# Patient Record
Sex: FEMALE | Race: WHITE | Employment: FULL TIME | URBAN - NONMETROPOLITAN AREA
[De-identification: names, ages, dates, MRNs, and addresses within clinical notes are randomized per-mention and may not be internally consistent; named-entity substitution may affect disease eponyms.]

---

## 2017-01-03 ENCOUNTER — OFFICE VISIT (OUTPATIENT)
Dept: ONCOLOGY | Age: 47
End: 2017-01-03

## 2017-01-03 VITALS
HEART RATE: 65 BPM | OXYGEN SATURATION: 99 % | RESPIRATION RATE: 16 BRPM | DIASTOLIC BLOOD PRESSURE: 79 MMHG | SYSTOLIC BLOOD PRESSURE: 121 MMHG | HEIGHT: 64 IN | WEIGHT: 182 LBS | BODY MASS INDEX: 31.07 KG/M2 | TEMPERATURE: 97.4 F

## 2017-01-03 DIAGNOSIS — Z98.890 STATUS POST LEFT BREAST LUMPECTOMY: ICD-10-CM

## 2017-01-03 DIAGNOSIS — Z51.81 ENCOUNTER FOR MONITORING TAMOXIFEN THERAPY: ICD-10-CM

## 2017-01-03 DIAGNOSIS — Z79.810 ENCOUNTER FOR MONITORING TAMOXIFEN THERAPY: ICD-10-CM

## 2017-01-03 DIAGNOSIS — C50.912 MALIGNANT NEOPLASM OF LEFT FEMALE BREAST, UNSPECIFIED SITE OF BREAST: Primary | ICD-10-CM

## 2017-01-03 PROCEDURE — 99214 OFFICE O/P EST MOD 30 MIN: CPT | Performed by: INTERNAL MEDICINE

## 2017-01-03 RX ORDER — LEVETIRACETAM 1000 MG/1
1000 TABLET ORAL
COMMUNITY

## 2017-01-03 RX ORDER — HYDROCODONE BITARTRATE AND ACETAMINOPHEN 5; 325 MG/1; MG/1
TABLET ORAL
COMMUNITY
Start: 2016-12-17 | End: 2017-07-03

## 2017-01-03 RX ORDER — CHOLECALCIFEROL (VITAMIN D3) 25 MCG
1000 CAPSULE ORAL
COMMUNITY

## 2017-01-03 RX ORDER — TAMOXIFEN CITRATE 20 MG/1
20 TABLET ORAL
COMMUNITY
Start: 2016-03-07 | End: 2017-01-03 | Stop reason: SDUPTHER

## 2017-01-03 RX ORDER — TAMOXIFEN CITRATE 20 MG/1
20 TABLET ORAL DAILY
Qty: 90 TABLET | Refills: 3 | Status: SHIPPED | OUTPATIENT
Start: 2017-01-03 | End: 2018-01-03 | Stop reason: SDUPTHER

## 2017-01-04 PROBLEM — Z98.890 STATUS POST LEFT BREAST LUMPECTOMY: Status: ACTIVE | Noted: 2017-01-04

## 2017-01-04 ASSESSMENT — ENCOUNTER SYMPTOMS
RECTAL PAIN: 0
WHEEZING: 0
BLOOD IN STOOL: 0
COLOR CHANGE: 0
DIARRHEA: 0
NAUSEA: 0
CHEST TIGHTNESS: 0
EYE DISCHARGE: 0
BACK PAIN: 0
COUGH: 0
ABDOMINAL PAIN: 0
ABDOMINAL DISTENTION: 0
SHORTNESS OF BREATH: 0
VOMITING: 0
TROUBLE SWALLOWING: 0
CONSTIPATION: 0
FACIAL SWELLING: 0
SORE THROAT: 0

## 2017-07-03 ENCOUNTER — OFFICE VISIT (OUTPATIENT)
Dept: ONCOLOGY | Age: 47
End: 2017-07-03

## 2017-07-03 VITALS
SYSTOLIC BLOOD PRESSURE: 121 MMHG | HEIGHT: 64 IN | HEART RATE: 50 BPM | OXYGEN SATURATION: 98 % | TEMPERATURE: 98 F | WEIGHT: 186.2 LBS | DIASTOLIC BLOOD PRESSURE: 77 MMHG | RESPIRATION RATE: 16 BRPM | BODY MASS INDEX: 31.79 KG/M2

## 2017-07-03 DIAGNOSIS — C50.912 MALIGNANT NEOPLASM OF LEFT FEMALE BREAST, UNSPECIFIED SITE OF BREAST: ICD-10-CM

## 2017-07-03 DIAGNOSIS — Z79.810 USE OF TAMOXIFEN (NOLVADEX): ICD-10-CM

## 2017-07-03 DIAGNOSIS — Z98.890 STATUS POST LEFT BREAST LUMPECTOMY: Primary | ICD-10-CM

## 2017-07-03 PROCEDURE — 99214 OFFICE O/P EST MOD 30 MIN: CPT | Performed by: INTERNAL MEDICINE

## 2017-07-03 ASSESSMENT — ENCOUNTER SYMPTOMS
NAUSEA: 0
BLOOD IN STOOL: 0
COUGH: 0
COLOR CHANGE: 0
BACK PAIN: 0
EYE DISCHARGE: 0
CHEST TIGHTNESS: 0
SORE THROAT: 0
RECTAL PAIN: 0
SHORTNESS OF BREATH: 0
DIARRHEA: 0
FACIAL SWELLING: 0
ABDOMINAL DISTENTION: 0
VOMITING: 0
WHEEZING: 0
ABDOMINAL PAIN: 0
CONSTIPATION: 0
TROUBLE SWALLOWING: 0

## 2018-01-03 ENCOUNTER — HOSPITAL ENCOUNTER (OUTPATIENT)
Dept: INFUSION THERAPY | Age: 48
Discharge: HOME | End: 2018-01-03
Payer: COMMERCIAL

## 2018-01-03 ENCOUNTER — OFFICE VISIT (OUTPATIENT)
Dept: ONCOLOGY | Age: 48
End: 2018-01-03
Payer: COMMERCIAL

## 2018-01-03 VITALS
WEIGHT: 191.2 LBS | SYSTOLIC BLOOD PRESSURE: 114 MMHG | TEMPERATURE: 96.9 F | DIASTOLIC BLOOD PRESSURE: 77 MMHG | OXYGEN SATURATION: 96 % | BODY MASS INDEX: 32.64 KG/M2 | RESPIRATION RATE: 16 BRPM | HEIGHT: 64 IN | HEART RATE: 65 BPM

## 2018-01-03 DIAGNOSIS — Z79.810 USE OF TAMOXIFEN (NOLVADEX): ICD-10-CM

## 2018-01-03 DIAGNOSIS — Z98.890 STATUS POST LEFT BREAST LUMPECTOMY: Primary | ICD-10-CM

## 2018-01-03 DIAGNOSIS — Z17.0 MALIGNANT NEOPLASM OF LEFT BREAST IN FEMALE, ESTROGEN RECEPTOR POSITIVE, UNSPECIFIED SITE OF BREAST (HCC): ICD-10-CM

## 2018-01-03 DIAGNOSIS — C50.912 MALIGNANT NEOPLASM OF LEFT BREAST IN FEMALE, ESTROGEN RECEPTOR POSITIVE, UNSPECIFIED SITE OF BREAST (HCC): ICD-10-CM

## 2018-01-03 PROCEDURE — 99214 OFFICE O/P EST MOD 30 MIN: CPT | Performed by: INTERNAL MEDICINE

## 2018-01-03 PROCEDURE — 99211 OFF/OP EST MAY X REQ PHY/QHP: CPT

## 2018-01-03 RX ORDER — TAMOXIFEN CITRATE 20 MG/1
20 TABLET ORAL DAILY
Qty: 90 TABLET | Refills: 3 | Status: SHIPPED | OUTPATIENT
Start: 2018-01-03 | End: 2019-03-04 | Stop reason: SDUPTHER

## 2018-01-03 ASSESSMENT — ENCOUNTER SYMPTOMS
TROUBLE SWALLOWING: 0
COUGH: 0
EYE DISCHARGE: 0
FACIAL SWELLING: 0
SORE THROAT: 0
RECTAL PAIN: 0
CHEST TIGHTNESS: 0
DIARRHEA: 0
NAUSEA: 0
ABDOMINAL DISTENTION: 0
CONSTIPATION: 0
BACK PAIN: 0
VOMITING: 0
BLOOD IN STOOL: 0
SHORTNESS OF BREATH: 0
COLOR CHANGE: 0
WHEEZING: 0
ABDOMINAL PAIN: 0

## 2018-01-03 NOTE — PROGRESS NOTES
History on 01/03/2018:  Overall the patient tolerates tamoxifen well. Her main complaints are hot flashes and occasional arthralgia. She does not report any new signs or symptoms concerning for evidence of recurrent disease. In October 2017 the patient had her annual mammogram which was benign. She has been not placed on any new medications since her last visit with me, no visits to the emergency room, no hospitalization. HPI   Past Medical History:   Diagnosis Date    Bruises easily     Cancer ([address]     Breast    Glaucoma     Hemorrhoids     Seizure ([address]     Seizures ([address]     Shingles       Past Surgical History:   Procedure Laterality Date    ANKLE SURGERY  12/2016    [facility]    [facility]    COLONOSCOPY      EYE SURGERY      LOBECTOMY      MASTECTOMY, PARTIAL      TUBAL LIGATION        History reviewed. No pertinent family history. Social History   Substance Use Topics    Smoking status: Former Smoker    Smokeless tobacco: Never Used    Alcohol use No      Current Outpatient Prescriptions   Medication Sig Dispense Refill    tamoxifen (NOLVADEX) 20 MG tablet Take 1 tablet by mouth daily 90 tablet 3    levETIRAcetam (KEPPRA) 1000 MG tablet Take 1,000 mg by mouth      Cholecalciferol (VITAMIN D-3) 1000 UNITS CAPS Take 1,000 Units by mouth       No current facility-administered medications for this visit. Allergies   Allergen Reactions    Carboprost Tromethamine Anxiety and Nausea And Vomiting      Health Maintenance   Topic Date Due    HIV screen  09/14/1983    DTaP/Tdap/Td vaccine (1 - Tdap) 09/14/1987    Cervical cancer screen  09/14/1989    Lipid screen  09/14/2008    Diabetes screen  09/14/2008    Breast cancer screen  09/30/2016    Flu vaccine (1) 09/01/2017        Subjective:   Review of Systems   Constitutional: Negative for activity change, appetite change, fatigue and fever.    HENT: Negative for congestion, dental problem, facial swelling, hearing loss, mouth sores, nosebleeds, sore throat, tinnitus and trouble swallowing. Eyes: Negative for discharge and visual disturbance. Respiratory: Negative for cough, chest tightness, shortness of breath and wheezing. Cardiovascular: Negative for chest pain, palpitations and leg swelling. Gastrointestinal: Negative for abdominal distention, abdominal pain, blood in stool, constipation, diarrhea, nausea, rectal pain and vomiting. Endocrine: Positive for heat intolerance. Negative for cold intolerance, polydipsia and polyuria. Genitourinary: Negative for decreased urine volume, difficulty urinating, dysuria, flank pain, hematuria and urgency. Musculoskeletal: Negative for arthralgias, back pain, gait problem, joint swelling, myalgias and neck stiffness. Skin: Negative for color change, rash and wound. Neurological: Negative for dizziness, tremors, seizures, speech difficulty, weakness, light-headedness, numbness and headaches. Hematological: Negative for adenopathy. Does not bruise/bleed easily. Psychiatric/Behavioral: Negative for confusion and sleep disturbance. The patient is not nervous/anxious. Objective:   Physical Exam   Constitutional: She is oriented to person, place, and time. She appears well-developed and well-nourished. No distress. HENT:   Head: Normocephalic. Mouth/Throat: Oropharynx is clear and moist. No oropharyngeal exudate. Eyes: EOM are normal. Pupils are equal, round, and reactive to light. Right eye exhibits no discharge. Left eye exhibits no discharge. No scleral icterus. Neck: Normal range of motion. Neck supple. No JVD present. No tracheal deviation present. No thyromegaly present. Cardiovascular: Normal rate and normal heart sounds. Exam reveals no gallop and no friction rub. No murmur heard. Pulmonary/Chest: Effort normal and breath sounds normal. No stridor. No respiratory distress. She has no wheezes.  She

## 2019-03-04 ENCOUNTER — HOSPITAL ENCOUNTER (OUTPATIENT)
Dept: INFUSION THERAPY | Age: 49
Discharge: HOME | End: 2019-03-04
Payer: COMMERCIAL

## 2019-03-04 ENCOUNTER — OFFICE VISIT (OUTPATIENT)
Dept: ONCOLOGY | Age: 49
End: 2019-03-04
Payer: COMMERCIAL

## 2019-03-04 VITALS
SYSTOLIC BLOOD PRESSURE: 108 MMHG | HEIGHT: 64 IN | HEART RATE: 68 BPM | BODY MASS INDEX: 27.18 KG/M2 | TEMPERATURE: 98.1 F | WEIGHT: 159.2 LBS | DIASTOLIC BLOOD PRESSURE: 61 MMHG | OXYGEN SATURATION: 100 % | RESPIRATION RATE: 16 BRPM

## 2019-03-04 DIAGNOSIS — Z17.0 MALIGNANT NEOPLASM OF LEFT BREAST IN FEMALE, ESTROGEN RECEPTOR POSITIVE, UNSPECIFIED SITE OF BREAST (HCC): ICD-10-CM

## 2019-03-04 DIAGNOSIS — Z79.810 USE OF TAMOXIFEN (NOLVADEX): ICD-10-CM

## 2019-03-04 DIAGNOSIS — Z98.890 STATUS POST LEFT BREAST LUMPECTOMY: Primary | ICD-10-CM

## 2019-03-04 DIAGNOSIS — C50.912 MALIGNANT NEOPLASM OF LEFT BREAST IN FEMALE, ESTROGEN RECEPTOR POSITIVE, UNSPECIFIED SITE OF BREAST (HCC): ICD-10-CM

## 2019-03-04 PROCEDURE — 99214 OFFICE O/P EST MOD 30 MIN: CPT | Performed by: INTERNAL MEDICINE

## 2019-03-04 PROCEDURE — 99201 HC NEW PT, OUTPT VISIT LEVEL 1: CPT

## 2019-03-04 RX ORDER — TAMOXIFEN CITRATE 20 MG/1
20 TABLET ORAL DAILY
Qty: 90 TABLET | Refills: 3 | Status: SHIPPED | OUTPATIENT
Start: 2019-03-04 | End: 2020-02-18 | Stop reason: SDUPTHER

## 2019-03-04 ASSESSMENT — ENCOUNTER SYMPTOMS
NAUSEA: 0
EYE DISCHARGE: 0
ABDOMINAL DISTENTION: 0
SHORTNESS OF BREATH: 0
DIARRHEA: 0
VOMITING: 0
TROUBLE SWALLOWING: 0
COLOR CHANGE: 0
COUGH: 0
SORE THROAT: 0
BACK PAIN: 0
CONSTIPATION: 0
RECTAL PAIN: 0
ABDOMINAL PAIN: 0
BLOOD IN STOOL: 0
WHEEZING: 0
CHEST TIGHTNESS: 0
FACIAL SWELLING: 0

## 2020-02-18 RX ORDER — TAMOXIFEN CITRATE 20 MG/1
20 TABLET ORAL DAILY
Qty: 90 TABLET | Refills: 3 | Status: SHIPPED | OUTPATIENT
Start: 2020-02-18 | End: 2021-02-16 | Stop reason: SDUPTHER

## 2020-03-24 ENCOUNTER — TELEPHONE (OUTPATIENT)
Dept: INFUSION THERAPY | Age: 50
End: 2020-03-24

## 2020-05-04 ENCOUNTER — OFFICE VISIT (OUTPATIENT)
Dept: ONCOLOGY | Age: 50
End: 2020-05-04
Payer: COMMERCIAL

## 2020-05-04 ENCOUNTER — HOSPITAL ENCOUNTER (OUTPATIENT)
Dept: INFUSION THERAPY | Age: 50
Discharge: HOME | End: 2020-05-04
Payer: COMMERCIAL

## 2020-05-04 VITALS
WEIGHT: 168.6 LBS | HEIGHT: 64 IN | BODY MASS INDEX: 28.79 KG/M2 | DIASTOLIC BLOOD PRESSURE: 84 MMHG | HEART RATE: 55 BPM | OXYGEN SATURATION: 99 % | SYSTOLIC BLOOD PRESSURE: 126 MMHG | RESPIRATION RATE: 16 BRPM | TEMPERATURE: 98.4 F

## 2020-05-04 PROCEDURE — 99214 OFFICE O/P EST MOD 30 MIN: CPT | Performed by: INTERNAL MEDICINE

## 2020-05-04 PROCEDURE — 99211 OFF/OP EST MAY X REQ PHY/QHP: CPT

## 2020-05-04 ASSESSMENT — ENCOUNTER SYMPTOMS
ABDOMINAL PAIN: 0
NAUSEA: 0
ABDOMINAL DISTENTION: 0
COLOR CHANGE: 0
RECTAL PAIN: 0
TROUBLE SWALLOWING: 0
FACIAL SWELLING: 0
BLOOD IN STOOL: 0
SHORTNESS OF BREATH: 0
CONSTIPATION: 0
DIARRHEA: 0
EYE DISCHARGE: 0
SORE THROAT: 0
WHEEZING: 0
CHEST TIGHTNESS: 0
VOMITING: 0
COUGH: 0
BACK PAIN: 0

## 2020-05-04 NOTE — PROGRESS NOTES
[facility] PROFESSIONAL SERVS  [facility] OF [facility]  [address] [facility] [address]  [address] [address]  [address] [address]  Dept: [phone]  Dept Fax: [phone]: [phone]     Visit Date: 5/4/2020     Neil Gonzalez is a 44 y.o. female who presents today for:   Chief Complaint   Patient presents with    Follow-up     BREAST CANCER        HPI:     Oncologic History:  · July 2014 She palpated a left breast mass  · 09/25/14 Mammogram and ultrasound  · 10/10/14 Ultrasound-guided biopsy showed invasive ductal carcinoma, ER+, NC+, HER2 negative and an ultrasound guided biopsy of an axillary node was negative  11/20/14 Left lumpectomy and sentinel node biopsy by Dr. Zach Bruno. Final pathology report showed: Invasive ductal carcinoma with neuroendocrine differentiation, [place] grade 2 (tubule formation 3, nuclear pleomorphism 2, mitotic activity 1), 2.6 cm in greatest dimension. Ductal carcinoma in situ, cribriform and solid pattern, intermediate nuclear grade. Surgical margins are negative for carcinoma, invasive carcinoma is 0.2 cm from the closest medial margin and the DCIS is 0.1 cm from the closest medial margin. Immunostains for synaptophysin and chromogranin were performed and the results show tumor cells are positive for synaptophysin and chromogranin (focal, patchy), suggesting neuroendocrine differentiation. Left axillary sentinel lymph nodes 1 and 2, excision: One of two lymph nodes is involved by isolated tumor cells in lymphovascular space by H&E and AE1/3 immunostain. Tumor cells are present in a lymphovascular space within the capsule of one lymph node. AE1/3 immunostain is performed on three blocks (BF1, BF2, and BF3) in the evaluation of these lymph nodes. Additional medial margin, excision: Breast tissue, negative for carcinoma.   · Oncotype Dx assay with RS of 36  · 03/20/15 Completed 4 cycles of adjuvant TC  · June 2015 Completed radiation therapy to left breast  · 05/27/15 Started

## 2020-07-20 PROCEDURE — 99213 OFFICE O/P EST LOW 20 MIN: CPT | Performed by: RADIOLOGY

## 2021-02-16 RX ORDER — TAMOXIFEN CITRATE 20 MG/1
20 TABLET ORAL DAILY
Qty: 90 TABLET | Refills: 3 | Status: SHIPPED | OUTPATIENT
Start: 2021-02-16 | End: 2022-02-16 | Stop reason: SDUPTHER

## 2021-05-04 ENCOUNTER — HOSPITAL ENCOUNTER (OUTPATIENT)
Dept: INFUSION THERAPY | Age: 51
Discharge: HOME | End: 2021-05-04
Payer: COMMERCIAL

## 2021-05-04 ENCOUNTER — OFFICE VISIT (OUTPATIENT)
Dept: ONCOLOGY | Age: 51
End: 2021-05-04
Payer: COMMERCIAL

## 2021-05-04 VITALS
BODY MASS INDEX: 29.98 KG/M2 | SYSTOLIC BLOOD PRESSURE: 120 MMHG | TEMPERATURE: 97.9 F | HEIGHT: 64 IN | DIASTOLIC BLOOD PRESSURE: 74 MMHG | HEART RATE: 57 BPM | WEIGHT: 175.6 LBS | RESPIRATION RATE: 16 BRPM | OXYGEN SATURATION: 98 %

## 2021-05-04 DIAGNOSIS — Z17.0 MALIGNANT NEOPLASM OF LEFT BREAST IN FEMALE, ESTROGEN RECEPTOR POSITIVE, UNSPECIFIED SITE OF BREAST (HCC): Primary | ICD-10-CM

## 2021-05-04 DIAGNOSIS — Z17.0 ER+ (ESTROGEN RECEPTOR POSITIVE STATUS): ICD-10-CM

## 2021-05-04 DIAGNOSIS — Z85.3 ENCOUNTER FOR FOLLOW-UP SURVEILLANCE OF BREAST CANCER: ICD-10-CM

## 2021-05-04 DIAGNOSIS — Z08 ENCOUNTER FOR FOLLOW-UP SURVEILLANCE OF BREAST CANCER: ICD-10-CM

## 2021-05-04 DIAGNOSIS — Z79.810 PROPHYLACTIC USE OF TAMOXIFEN: ICD-10-CM

## 2021-05-04 DIAGNOSIS — Z98.890 S/P BREAST LUMPECTOMY: ICD-10-CM

## 2021-05-04 DIAGNOSIS — C50.912 MALIGNANT NEOPLASM OF LEFT BREAST IN FEMALE, ESTROGEN RECEPTOR POSITIVE, UNSPECIFIED SITE OF BREAST (HCC): Primary | ICD-10-CM

## 2021-05-04 PROCEDURE — 99214 OFFICE O/P EST MOD 30 MIN: CPT | Performed by: INTERNAL MEDICINE

## 2021-05-04 PROCEDURE — 99211 OFF/OP EST MAY X REQ PHY/QHP: CPT

## 2021-05-04 ASSESSMENT — ENCOUNTER SYMPTOMS
COUGH: 0
COLOR CHANGE: 0
BLOOD IN STOOL: 0
SHORTNESS OF BREATH: 0
ABDOMINAL DISTENTION: 0
SORE THROAT: 0
EYE DISCHARGE: 0
DIARRHEA: 0
RECTAL PAIN: 0
WHEEZING: 0
ABDOMINAL PAIN: 0
TROUBLE SWALLOWING: 0
NAUSEA: 0
CHEST TIGHTNESS: 0
BACK PAIN: 0
CONSTIPATION: 0
FACIAL SWELLING: 0
VOMITING: 0

## 2021-05-04 NOTE — PATIENT INSTRUCTIONS
1.  She was instructed to wean herself off  Zoloft. She will call with update on her mood. 2.  The patient was instructed to see a gynecologist to while staying on tamoxifen  3.   RTC to see me in 1 year

## 2021-05-18 RX ORDER — TAMOXIFEN CITRATE 20 MG/1
20 TABLET ORAL DAILY
Qty: 20 TABLET | Refills: 1 | Status: SHIPPED | OUTPATIENT
Start: 2021-05-18 | End: 2022-05-06

## 2022-02-16 RX ORDER — TAMOXIFEN CITRATE 20 MG/1
20 TABLET ORAL DAILY
Qty: 90 TABLET | Refills: 3 | Status: SHIPPED | OUTPATIENT
Start: 2022-02-16

## 2022-05-05 ASSESSMENT — ENCOUNTER SYMPTOMS
NAUSEA: 0
BLOOD IN STOOL: 0
ABDOMINAL DISTENTION: 0
CONSTIPATION: 0
SHORTNESS OF BREATH: 0
COUGH: 0
DIARRHEA: 0
TROUBLE SWALLOWING: 0
BACK PAIN: 0
VOMITING: 0
COLOR CHANGE: 0
WHEEZING: 0
EYE DISCHARGE: 0
SORE THROAT: 0
ABDOMINAL PAIN: 0
FACIAL SWELLING: 0
CHEST TIGHTNESS: 0
RECTAL PAIN: 0

## 2022-05-05 NOTE — PROGRESS NOTES
[facility] PROFESSIONAL SERVS  [facility] OF [facility]  [address]  [address]  [facility] [address]  Dept: [phone]  Dept Fax: [phone]: [phone]     Visit Date: 5/6/2022     Donna Castaneda is a 46 y.o. female who presents today for:   Chief Complaint   Patient presents with    Follow-up     Malignant neoplasm of left breast in female, estrogen receptor positive, unspecified site of breast [facility])        HPI:     Oncologic History:  · July 2014 She palpated a left breast mass  · 09/25/14 Mammogram and ultrasound  · 10/10/14 Ultrasound-guided biopsy showed invasive ductal carcinoma, ER+, SC+, HER2 negative and an ultrasound guided biopsy of an axillary node was negative  11/20/14 Left lumpectomy and sentinel node biopsy by Dr. Chencho Bhagat. Final pathology report showed: Invasive ductal carcinoma with neuroendocrine differentiation, [place] grade 2 (tubule formation 3, nuclear pleomorphism 2, mitotic activity 1), 2.6 cm in greatest dimension. Ductal carcinoma in situ, cribriform and solid pattern, intermediate nuclear grade. Surgical margins are negative for carcinoma, invasive carcinoma is 0.2 cm from the closest medial margin and the DCIS is 0.1 cm from the closest medial margin. Immunostains for synaptophysin and chromogranin were performed and the results show tumor cells are positive for synaptophysin and chromogranin (focal, patchy), suggesting neuroendocrine differentiation. Left axillary sentinel lymph nodes 1 and 2, excision: One of two lymph nodes is involved by isolated tumor cells in lymphovascular space by H&E and AE1/3 immunostain. Tumor cells are present in a lymphovascular space within the capsule of one lymph node. AE1/3 immunostain is performed on three blocks (BF1, BF2, and BF3) in the evaluation of these lymph nodes. Additional medial margin, excision: Breast tissue, negative for carcinoma.   · Oncotype Dx assay with RS of 36  · 03/20/15 Completed 4 cycles of adjuvant TC  · June 2015 Completed radiation therapy to left breast  · 05/27/15 Started Tamoxifen    Interim History on 05/05/2022:  The patient presents for 1 year follow-up visit. Overall she tolerates tamoxifen well. Minimal hot flashes and occasional arthralgia. She does not report any new signs or symptoms concerning for evidence of recurrent disease. In November 2021 the patient had her annual mammogram at [facility] which was benign. No visits to the emergency room, no hospitalization. HPI   Past Medical History:   Diagnosis Date    Bruises easily     Cancer ([address]     Breast    Glaucoma     Hemorrhoids     [facility]     Seizures ([facility]     Shingles       Past Surgical History:   Procedure Laterality Date    ANKLE SURGERY  12/2016    [facility]    [facility]    COLONOSCOPY      EYE SURGERY      LOBECTOMY      MASTECTOMY, PARTIAL      TUBAL LIGATION        History reviewed. No pertinent family history. Social History     Tobacco Use    Smoking status: Former Smoker    Smokeless tobacco: Never Used   Substance Use Topics    Alcohol use: No      Current Outpatient Medications   Medication Sig Dispense Refill    venlafaxine (EFFEXOR XR) 37.5 MG extended release capsule Take 1 capsule by mouth daily 30 capsule 2    tamoxifen (NOLVADEX) 20 MG tablet Take 1 tablet by mouth daily 90 tablet 3    levETIRAcetam (KEPPRA) 1000 MG tablet Take 1,000 mg by mouth      Cholecalciferol (VITAMIN D-3) 1000 UNITS CAPS Take 1,000 Units by mouth       No current facility-administered medications for this visit.       Allergies   Allergen Reactions    Covid-19 Mrna Vacc (Moderna)      Heart palpitations, Sinus Tach    Carboprost Tromethamine Anxiety and Nausea And Vomiting      Health Maintenance   Topic Date Due    Depression Screen  Never done    HIV screen  Never done    Hepatitis C screen  Never done    Cervical cancer screen  Never done    Diabetes screen Appearance: She is well-developed. HENT:      Head: Normocephalic. Mouth/Throat:      Pharynx: No oropharyngeal exudate. Eyes:      General: No scleral icterus. Right eye: No discharge. Left eye: No discharge. Pupils: Pupils are equal, round, and reactive to light. Neck:      Thyroid: No thyromegaly. Vascular: No JVD. Trachea: No tracheal deviation. Cardiovascular:      Rate and Rhythm: Normal rate. Heart sounds: Normal heart sounds. No murmur heard. No friction rub. No gallop. Pulmonary:      Effort: Pulmonary effort is normal. No respiratory distress. Breath sounds: Normal breath sounds. No stridor. No wheezing or rales. Chest:      Chest wall: No tenderness. Abdominal:      General: Bowel sounds are normal. There is no distension. Palpations: Abdomen is soft. There is no mass. Tenderness: There is no abdominal tenderness. There is no rebound. Musculoskeletal:         General: Normal range of motion. Cervical back: Normal range of motion and neck supple. Lymphadenopathy:      Cervical: No cervical adenopathy. Skin:     General: Skin is warm. Findings: No erythema or rash. Neurological:      Mental Status: She is alert and oriented to person, place, and time. Cranial Nerves: No cranial nerve deficit. Motor: No abnormal muscle tone. Deep Tendon Reflexes: Reflexes are normal and symmetric. Psychiatric:         Behavior: Behavior normal.         Thought Content:  Thought content normal.         Judgment: Judgment normal.        BP (!) 146/70 (Site: Right Upper Arm, Position: Sitting, Cuff Size: Medium Adult)   Pulse 78   Temp 98.7 °F (37.1 °C) (Oral)   Resp 16   Ht 5' 4\" (1.626 m)   Wt 173 lb 12.8 oz (78.8 kg)   LMP 02/01/2015 (Within Days)   SpO2 98%   BMI 29.83 kg/m²      Imaging Studies and Labs:     Mammogram in November 2021 showed:  No specific mammographic evidence of malignancy. BI-RADS:  2:  Benign    No results found for: WBC, HGB, HCT, MCV, PLT       Assessment & Plan:   1. Status post left breast lumpectomy  2. Malignant neoplasm of left female breast.   3. Encounter for monitoring tamoxifen therapy    This is an 49-year-old postmenopausal patient with a history of stage I left breast cancer. The patient is status post lumpectomy, adjuvant chemotherapy with 4 cycles of TC, and radiation treatment. Since May 2015 she is on adjuvant tamoxifen. She tolerates tamoxifen well without major side effects. She has mild and stable hot flashes. There is no evidence of disease recurrence on today's physical examination. She had annual mammogram in November 2021 at [facility] that was benign. After being on tamoxifen for 5 years, we discussed her options of switching her to AI versus continuation of tamoxifen. Since the patient has been tolerating tamoxifen so well we made decision to continue tamoxifen for total of 10 years. Due to mild depression the patient was placed on Zoloft by her primary care physician. Zoloft is strong CYP2D6 inhibitor and have the potential to adversely affect efficacy of tamoxifen. She stopped taking Zoloft last year but she feels that her depression is flaring up. She is placed on Effexor which is okay with tamoxifen. .No follow-ups on file.

## 2022-05-06 ENCOUNTER — OFFICE VISIT (OUTPATIENT)
Dept: ONCOLOGY | Age: 52
End: 2022-05-06
Payer: COMMERCIAL

## 2022-05-06 ENCOUNTER — HOSPITAL ENCOUNTER (OUTPATIENT)
Dept: INFUSION THERAPY | Age: 52
Discharge: HOME | End: 2022-05-06
Payer: COMMERCIAL

## 2022-05-06 VITALS
DIASTOLIC BLOOD PRESSURE: 70 MMHG | RESPIRATION RATE: 16 BRPM | HEART RATE: 78 BPM | TEMPERATURE: 98.7 F | HEIGHT: 64 IN | WEIGHT: 173.8 LBS | OXYGEN SATURATION: 98 % | SYSTOLIC BLOOD PRESSURE: 146 MMHG | BODY MASS INDEX: 29.67 KG/M2

## 2022-05-06 VITALS
RESPIRATION RATE: 16 BRPM | OXYGEN SATURATION: 98 % | TEMPERATURE: 98.7 F | HEIGHT: 64 IN | HEART RATE: 78 BPM | WEIGHT: 173.8 LBS | DIASTOLIC BLOOD PRESSURE: 70 MMHG | SYSTOLIC BLOOD PRESSURE: 146 MMHG | BODY MASS INDEX: 29.67 KG/M2

## 2022-05-06 DIAGNOSIS — Z17.0 ER+ (ESTROGEN RECEPTOR POSITIVE STATUS): ICD-10-CM

## 2022-05-06 DIAGNOSIS — C50.912 MALIGNANT NEOPLASM OF LEFT BREAST IN FEMALE, ESTROGEN RECEPTOR POSITIVE, UNSPECIFIED SITE OF BREAST (HCC): Primary | ICD-10-CM

## 2022-05-06 DIAGNOSIS — Z79.810 PROPHYLACTIC USE OF TAMOXIFEN: ICD-10-CM

## 2022-05-06 DIAGNOSIS — Z98.890 S/P BREAST LUMPECTOMY: ICD-10-CM

## 2022-05-06 DIAGNOSIS — Z85.3 ENCOUNTER FOR FOLLOW-UP SURVEILLANCE OF BREAST CANCER: ICD-10-CM

## 2022-05-06 DIAGNOSIS — Z08 ENCOUNTER FOR FOLLOW-UP SURVEILLANCE OF BREAST CANCER: ICD-10-CM

## 2022-05-06 DIAGNOSIS — Z17.0 MALIGNANT NEOPLASM OF LEFT BREAST IN FEMALE, ESTROGEN RECEPTOR POSITIVE, UNSPECIFIED SITE OF BREAST (HCC): Primary | ICD-10-CM

## 2022-05-06 PROCEDURE — 99211 OFF/OP EST MAY X REQ PHY/QHP: CPT

## 2022-05-06 PROCEDURE — 99214 OFFICE O/P EST MOD 30 MIN: CPT | Performed by: INTERNAL MEDICINE

## 2022-05-06 RX ORDER — VENLAFAXINE HYDROCHLORIDE 37.5 MG/1
37.5 CAPSULE, EXTENDED RELEASE ORAL DAILY
Qty: 30 CAPSULE | Refills: 2 | Status: SHIPPED | OUTPATIENT
Start: 2022-05-06 | End: 2022-06-05

## 2023-02-08 RX ORDER — TAMOXIFEN CITRATE 20 MG/1
20 TABLET ORAL DAILY
Qty: 90 TABLET | Refills: 0 | Status: SHIPPED | OUTPATIENT
Start: 2023-02-08

## 2023-05-08 ENCOUNTER — OFFICE VISIT (OUTPATIENT)
Dept: ONCOLOGY | Age: 53
End: 2023-05-08
Payer: COMMERCIAL

## 2023-05-08 ENCOUNTER — HOSPITAL ENCOUNTER (OUTPATIENT)
Dept: INFUSION THERAPY | Age: 53
Discharge: HOME | End: 2023-05-08
Payer: COMMERCIAL

## 2023-05-08 VITALS
OXYGEN SATURATION: 98 % | TEMPERATURE: 97.8 F | SYSTOLIC BLOOD PRESSURE: 123 MMHG | DIASTOLIC BLOOD PRESSURE: 66 MMHG | HEART RATE: 77 BPM

## 2023-05-08 VITALS
HEART RATE: 77 BPM | OXYGEN SATURATION: 98 % | TEMPERATURE: 97.8 F | SYSTOLIC BLOOD PRESSURE: 123 MMHG | WEIGHT: 175.6 LBS | DIASTOLIC BLOOD PRESSURE: 66 MMHG | BODY MASS INDEX: 30.14 KG/M2

## 2023-05-08 DIAGNOSIS — Z17.0 MALIGNANT NEOPLASM OF LEFT BREAST IN FEMALE, ESTROGEN RECEPTOR POSITIVE, UNSPECIFIED SITE OF BREAST (HCC): Primary | ICD-10-CM

## 2023-05-08 DIAGNOSIS — Z79.810 PROPHYLACTIC USE OF TAMOXIFEN: ICD-10-CM

## 2023-05-08 DIAGNOSIS — C50.912 MALIGNANT NEOPLASM OF LEFT BREAST IN FEMALE, ESTROGEN RECEPTOR POSITIVE, UNSPECIFIED SITE OF BREAST (HCC): Primary | ICD-10-CM

## 2023-05-08 PROCEDURE — 99214 OFFICE O/P EST MOD 30 MIN: CPT | Performed by: PHYSICIAN ASSISTANT

## 2023-05-08 PROCEDURE — 99211 OFF/OP EST MAY X REQ PHY/QHP: CPT

## 2023-05-08 RX ORDER — TAMOXIFEN CITRATE 20 MG/1
20 TABLET ORAL DAILY
Qty: 90 TABLET | Refills: 3 | Status: SHIPPED | OUTPATIENT
Start: 2023-05-08

## 2023-06-01 NOTE — PROGRESS NOTES
[facility]  [facility] OF [facility]  [address] [address]  [address]  [address] [address]  Dept: [phone]  Dept Fax: [phone]: [phone]     Visit Date: 5/4/2021     José Miguel Vasquez is a 44 y.o. female who presents today for:   Chief Complaint   Patient presents with    Follow-up     Malignant neoplasm of left breast in female, estrogen receptor positive, unspecified site of breast (H        HPI:     Oncologic History:  · July 2014 She palpated a left breast mass  · 09/25/14 Mammogram and ultrasound  · 10/10/14 Ultrasound-guided biopsy showed invasive ductal carcinoma, ER+, AK+, HER2 negative and an ultrasound guided biopsy of an axillary node was negative  11/20/14 Left lumpectomy and sentinel node biopsy by Dr. Lady Izaguirre. Final pathology report showed: Invasive ductal carcinoma with neuroendocrine differentiation, [place] grade 2 (tubule formation 3, nuclear pleomorphism 2, mitotic activity 1), 2.6 cm in greatest dimension. Ductal carcinoma in situ, cribriform and solid pattern, intermediate nuclear grade. Surgical margins are negative for carcinoma, invasive carcinoma is 0.2 cm from the closest medial margin and the DCIS is 0.1 cm from the closest medial margin. Immunostains for synaptophysin and chromogranin were performed and the results show tumor cells are positive for synaptophysin and chromogranin (focal, patchy), suggesting neuroendocrine differentiation. Left axillary sentinel lymph nodes 1 and 2, excision: One of two lymph nodes is involved by isolated tumor cells in lymphovascular space by H&E and AE1/3 immunostain. Tumor cells are present in a lymphovascular space within the capsule of one lymph node. AE1/3 immunostain is performed on three blocks (BF1, BF2, and BF3) in the evaluation of these lymph nodes. Additional medial margin, excision: Breast tissue, negative for carcinoma.   · Oncotype Dx assay with RS of 36  · 03/20/15 Completed 4 cycles of adjuvant TC  · Pt called in requesting refills on zofran, pyridoxine and doxylamine.   Doxylamine and pyridoxine as well as zofran was last filled on 03/20 with no refills.  Are these all okay to refill?   June 2015 Completed radiation therapy to left breast  · 05/27/15 Started Tamoxifen    Interim History on 05/04/2021:  The patient presents for 1 year follow-up visit. Overall she tolerates tamoxifen well. Minimal hot flashes and occasional arthralgia. She does not report any new signs or symptoms concerning for evidence of recurrent disease. In November 2020 the patient had her annual mammogram at [address] [place] [address] which was benign. She has been placed on Zoloft by her primary care physician. No visits to the emergency room, no hospitalization. HPI   Past Medical History:   Diagnosis Date    Bruises easily     Cancer ([address]     Breast    Glaucoma     Hemorrhoids     [facility]     Seizures ([facility]     Shingles       Past Surgical History:   Procedure Laterality Date    ANKLE SURGERY  12/2016    [facility]    [facility]    COLONOSCOPY      EYE SURGERY      LOBECTOMY      MASTECTOMY, PARTIAL      TUBAL LIGATION        No family history on file. Social History     Tobacco Use    Smoking status: Former Smoker    Smokeless tobacco: Never Used   Substance Use Topics    Alcohol use: No      Current Outpatient Medications   Medication Sig Dispense Refill    tamoxifen (NOLVADEX) 20 MG tablet Take 1 tablet by mouth daily 20 tablet 1    sertraline (ZOLOFT) 50 MG tablet Take 50 mg by mouth daily      tamoxifen (NOLVADEX) 20 MG tablet Take 1 tablet by mouth daily 90 tablet 3    levETIRAcetam (KEPPRA) 1000 MG tablet Take 1,000 mg by mouth      Cholecalciferol (VITAMIN D-3) 1000 UNITS CAPS Take 1,000 Units by mouth       No current facility-administered medications for this visit.       Allergies   Allergen Reactions    Carboprost Tromethamine Anxiety and Nausea And Vomiting      Health Maintenance   Topic Date Due    Hepatitis C screen  Never done    COVID-19 Vaccine (1) Never done    HIV screen  Never done    Cervical cancer screen  Never done    Lipid screen  Never done    Diabetes screen  Never done    Shingles Vaccine (1 of 2) Never done    Colon cancer screen colonoscopy  Never done    Flu vaccine (Season Ended) 09/01/2021    Breast cancer screen  11/18/2021    DTaP/Tdap/Td vaccine (3 - Td) 11/26/2028    Hepatitis A vaccine  Aged Out    Hepatitis B vaccine  Aged Out    Hib vaccine  Aged Out    Meningococcal (ACWY) vaccine  Aged Out    Pneumococcal 0-64 years Vaccine  Aged Out        Subjective:   Review of Systems   Constitutional: Negative for activity change, appetite change, fatigue and fever. HENT: Negative for congestion, dental problem, facial swelling, hearing loss, mouth sores, nosebleeds, sore throat, tinnitus and trouble swallowing. Eyes: Negative for discharge and visual disturbance. Respiratory: Negative for cough, chest tightness, shortness of breath and wheezing. Cardiovascular: Negative for chest pain, palpitations and leg swelling. Gastrointestinal: Negative for abdominal distention, abdominal pain, blood in stool, constipation, diarrhea, nausea, rectal pain and vomiting. Endocrine: Positive for heat intolerance. Negative for cold intolerance, polydipsia and polyuria. Genitourinary: Negative for decreased urine volume, difficulty urinating, dysuria, flank pain, hematuria and urgency. Musculoskeletal: Negative for arthralgias, back pain, gait problem, joint swelling, myalgias and neck stiffness. Skin: Negative for color change, rash and wound. Neurological: Negative for dizziness, tremors, seizures, speech difficulty, weakness, light-headedness, numbness and headaches. Hematological: Negative for adenopathy. Does not bruise/bleed easily. Psychiatric/Behavioral: Negative for confusion and sleep disturbance. The patient is not nervous/anxious. Objective:   Physical Exam   Constitutional: She is oriented to person, place, and time. She appears well-developed and well-nourished. No distress.    HENT: Head: Normocephalic. Mouth/Throat: Oropharynx is clear and moist. No oropharyngeal exudate. Eyes: Pupils are equal, round, and reactive to light. EOM are normal. Right eye exhibits no discharge. Left eye exhibits no discharge. No scleral icterus. Neck: Normal range of motion. Neck supple. No JVD present. No tracheal deviation present. No thyromegaly present. Cardiovascular: Normal rate and normal heart sounds. Exam reveals no gallop and no friction rub. No murmur heard. Pulmonary/Chest: Effort normal and breath sounds normal. No stridor. No respiratory distress. She has no wheezes. She has no rales. She exhibits no tenderness. Abdominal: Soft. Bowel sounds are normal. She exhibits no distension and no mass. There is no abdominal tenderness. There is no rebound. Musculoskeletal: Normal range of motion. General: No edema. Lymphadenopathy:     She has no cervical adenopathy. Neurological: She is alert and oriented to person, place, and time. She has normal reflexes. No cranial nerve deficit. She exhibits normal muscle tone. Skin: Skin is warm. No rash noted. No erythema. Psychiatric: She has a normal mood and affect. Her behavior is normal. Judgment and thought content normal.   Vitals reviewed. /74 (Site: Right Upper Arm, Position: Sitting, Cuff Size: Medium Adult)   Pulse 57   Temp 97.9 °F (36.6 °C) (Oral)   Resp 16   Ht 5' 4\" (1.626 m)   Wt 175 lb 9.6 oz (79.7 kg)   LMP 02/01/2015 (Within Days)   SpO2 98%   BMI 30.14 kg/m²      Imaging Studies and Labs:     Mammogram in November 2020 showed:  No specific mammographic evidence of malignancy. BI-RADS:  2:  Benign    No results found for: WBC, HGB, HCT, MCV, PLT       Assessment & Plan:   1. Status post left breast lumpectomy  2. Malignant neoplasm of left female breast.   3. Encounter for monitoring tamoxifen therapy    This is an 61-year-old postmenopausal patient with a history of stage I left breast cancer.  The patient is status post lumpectomy, adjuvant chemotherapy with 4 cycles of TC, and radiation treatment. Since May 2015 she is on adjuvant tamoxifen. She tolerates tamoxifen well without major side effects. She has mild and stable hot flashes. There is no evidence of disease recurrence on today's physical examination. She had annual mammogram in November 2019 at [address] [place] [address] that was benign. After being on tamoxifen for 5 years, we discussed her options of switching her to AI versus continuation of tamoxifen. Since the patient has been tolerating tamoxifen so well we made decision to continue tamoxifen for total of 10 years. Due to mild depression the patient was placed on Zoloft by her primary care physician. Zoloft is strong CYP2D6 inhibitor and have the potential to adversely affect efficacy of tamoxifen. The patient reports that her depression is almost resolved. Therefore she was instructed to wean herself off Zoloft    . Return in about 1 year (around 5/4/2022).

## 2024-05-06 ENCOUNTER — OFFICE VISIT (OUTPATIENT)
Dept: ONCOLOGY | Age: 54
End: 2024-05-06
Payer: COMMERCIAL

## 2024-05-06 ENCOUNTER — HOSPITAL ENCOUNTER (OUTPATIENT)
Dept: INFUSION THERAPY | Age: 54
Discharge: HOME | End: 2024-05-06
Payer: COMMERCIAL

## 2024-05-06 VITALS
WEIGHT: 178.2 LBS | TEMPERATURE: 98.3 F | BODY MASS INDEX: 30.42 KG/M2 | RESPIRATION RATE: 16 BRPM | DIASTOLIC BLOOD PRESSURE: 73 MMHG | HEART RATE: 68 BPM | OXYGEN SATURATION: 96 % | HEIGHT: 64 IN | SYSTOLIC BLOOD PRESSURE: 123 MMHG

## 2024-05-06 VITALS
TEMPERATURE: 98.3 F | HEART RATE: 68 BPM | DIASTOLIC BLOOD PRESSURE: 73 MMHG | SYSTOLIC BLOOD PRESSURE: 123 MMHG | OXYGEN SATURATION: 96 % | RESPIRATION RATE: 16 BRPM

## 2024-05-06 DIAGNOSIS — Z17.0 MALIGNANT NEOPLASM OF LEFT BREAST IN FEMALE, ESTROGEN RECEPTOR POSITIVE, UNSPECIFIED SITE OF BREAST (HCC): Primary | ICD-10-CM

## 2024-05-06 DIAGNOSIS — C50.912 MALIGNANT NEOPLASM OF LEFT BREAST IN FEMALE, ESTROGEN RECEPTOR POSITIVE, UNSPECIFIED SITE OF BREAST (HCC): Primary | ICD-10-CM

## 2024-05-06 DIAGNOSIS — Z17.0 MALIGNANT NEOPLASM OF LEFT BREAST IN FEMALE, ESTROGEN RECEPTOR POSITIVE, UNSPECIFIED SITE OF BREAST (HCC): ICD-10-CM

## 2024-05-06 DIAGNOSIS — C50.912 MALIGNANT NEOPLASM OF LEFT BREAST IN FEMALE, ESTROGEN RECEPTOR POSITIVE, UNSPECIFIED SITE OF BREAST (HCC): ICD-10-CM

## 2024-05-06 DIAGNOSIS — Z79.810 PROPHYLACTIC USE OF TAMOXIFEN: ICD-10-CM

## 2024-05-06 LAB
ALBUMIN SERPL BCG-MCNC: 4.1 G/DL (ref 3.5–5.1)
ALP SERPL-CCNC: 59 U/L (ref 38–126)
ALT SERPL W/O P-5'-P-CCNC: 13 U/L (ref 11–66)
AST SERPL-CCNC: 17 U/L (ref 5–40)
BASOPHILS # BLD AUTO: 0 THOU/MM3 (ref 0–0.1)
BASOPHILS NFR BLD AUTO: 0 % (ref 0–3)
BILIRUB CONJ SERPL-MCNC: < 0.2 MG/DL (ref 0–0.3)
BILIRUB SERPL-MCNC: 0.2 MG/DL (ref 0.3–1.2)
BUN BLDP-MCNC: 13 MG/DL (ref 8–26)
CHLORIDE BLD-SCNC: 108 MEQ/L (ref 98–109)
CREAT BLD-MCNC: 0.6 MG/DL (ref 0.5–1.2)
EOSINOPHIL # BLD AUTO: 0.1 THOU/MM3 (ref 0–0.4)
EOSINOPHIL NFR BLD AUTO: 2 % (ref 0–4)
ERYTHROCYTE [DISTWIDTH] IN BLOOD BY AUTOMATED COUNT: 12.3 % (ref 11.5–14.5)
GFR SERPL CREATININE-BSD FRML MDRD: > 90 ML/MIN/1.73M2
GLUCOSE BLD-MCNC: 96 MG/DL (ref 70–108)
HCT VFR BLD AUTO: 40.8 % (ref 37–47)
HGB BLD-MCNC: 13.4 GM/DL (ref 12–16)
IMM GRANULOCYTES # BLD AUTO: 0.01 THOU/MM3 (ref 0–0.07)
IMM GRANULOCYTES NFR BLD AUTO: 0 %
IONIZED CALCIUM, WHOLE BLOOD: 1.26 MMOL/L (ref 1.12–1.32)
LYMPHOCYTES # BLD AUTO: 2.3 THOU/MM3 (ref 1–4.8)
LYMPHOCYTES NFR BLD AUTO: 43 % (ref 15–47)
MCH RBC QN AUTO: 30.7 PG (ref 26–33)
MCHC RBC AUTO-ENTMCNC: 32.8 GM/DL (ref 32.2–35.5)
MCV RBC AUTO: 94 FL (ref 81–99)
MONOCYTES # BLD AUTO: 0.5 THOU/MM3 (ref 0.4–1.3)
MONOCYTES NFR BLD AUTO: 9 % (ref 0–12)
NEUTROPHILS ABSOLUTE: 2.5 THOU/MM3 (ref 1.8–7.7)
NEUTROPHILS NFR BLD AUTO: 46 % (ref 43–75)
PLATELET # BLD AUTO: 220 THOU/MM3 (ref 130–400)
PMV BLD AUTO: 10.2 FL (ref 9.4–12.4)
POTASSIUM BLD-SCNC: 4.5 MEQ/L (ref 3.5–4.9)
PROT SERPL-MCNC: 6.8 G/DL (ref 6.1–8)
RBC # BLD AUTO: 4.36 MILL/MM3 (ref 4.2–5.4)
SODIUM BLD-SCNC: 143 MEQ/L (ref 138–146)
TOTAL CO2, WHOLE BLOOD: 26 MEQ/L (ref 23–33)
WBC # BLD AUTO: 5.4 THOU/MM3 (ref 4.8–10.8)

## 2024-05-06 PROCEDURE — 85025 COMPLETE CBC W/AUTO DIFF WBC: CPT

## 2024-05-06 PROCEDURE — 80076 HEPATIC FUNCTION PANEL: CPT

## 2024-05-06 PROCEDURE — 80047 BASIC METABLC PNL IONIZED CA: CPT

## 2024-05-06 PROCEDURE — 99211 OFF/OP EST MAY X REQ PHY/QHP: CPT

## 2024-05-06 PROCEDURE — 99214 OFFICE O/P EST MOD 30 MIN: CPT | Performed by: NURSE PRACTITIONER

## 2024-05-06 RX ORDER — TAMOXIFEN CITRATE 20 MG/1
20 TABLET ORAL DAILY
Qty: 90 TABLET | Refills: 3 | Status: SHIPPED | OUTPATIENT
Start: 2024-05-06

## 2024-05-06 NOTE — PROGRESS NOTES
[facility] [place] [facility]  [facility]  [address]  [address]  [facility] [address]  Dept: [phone]  Loc: [phone]       Visit Date:5/6/2024     Rhonda Miles is a 53 y.o. female who presents today for:   Chief Complaint   Patient presents with    Follow-up     Malignant neoplasm of left breast in female, estrogen receptor positive, unspecified site of breast         HPI:   Rhonda Miles is a 53 y.o. female with a history of left breast cancer.    The patient initially presented with a self palpated left breast mass.    10/10/2014 ultrasound-guided biopsy of the left breast mass showed invasive ductal carcinoma. ER positive, [place] positive and HER2/edy negative.    11/20/2014 the patient underwent a left breast lumpectomy.  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  Ductal carcinoma in situ cribriform and solid pattern, intermediate nuclear grade.  Surgical margins were negative for carcinoma.  Left axillary sentinel lymph nodes, 1 of 2 lymph nodes involved.      Oncotype Dx recurrence score 36    03/20/2015 the patient completed 4 cycles of adjuvant TC    05/26/2015 the patient completed a course of radiation therapy followed by tumor bed boost radiation from 05/27/2015 - 06/02/2015.    05/27/2015 the patient initiated treatment with tamoxifen    11/29/2022 screening mammogram results showed no mammographic evidence of malignancy.    09/18/2023 DEXA bone density scan results show osteopenia with a major osteoporotic fracture risk of 8.9% and hip fracture 0.2%.    11/30/2023 screening mammogram results showed no mammographic evidence of malignancy          Interval History 5/6/2024: The patient returns for follow-up on her history of stage I left breast cancer.  She is s/p lumpectomy, adjuvant chemotherapy and radiation therapy.  She is currently on treatment with tamoxifen

## 2024-05-06 NOTE — PATIENT INSTRUCTIONS
Return to clinic for MD follow up in one year  Notify the office of any new or worsening symptoms

## 2025-05-04 NOTE — PROGRESS NOTES
[facility] [place] [facility]  [facility]  [address]  [address]  [facility] [address]  Dept: [phone]  Loc: [phone]       Visit Date:5/5/2025     Rhonda Miles is a 54 y.o. female who presents today for:   Chief Complaint   Patient presents with    Follow-up     Malignant neoplasm of left breast in female, estrogen receptor positive, unspecified site of breast        HPI:   Rhonda Miles is a 54 y.o. female with a history of left breast cancer.     The patient initially presented with a self palpated left breast mass.     10/10/2014 ultrasound-guided biopsy of the left breast mass showed invasive ductal carcinoma. ER positive, [place] positive and HER2/edy negative.     11/20/2014 the patient underwent a left breast lumpectomy.  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  Ductal carcinoma in situ cribriform and solid pattern, intermediate nuclear grade.  Surgical margins were negative for carcinoma.  Left axillary sentinel lymph nodes, 1 of 2 lymph nodes involved.       Oncotype Dx recurrence score 36     03/20/2015 the patient completed 4 cycles of adjuvant TC     05/26/2015 the patient completed a course of radiation therapy followed by tumor bed boost radiation from 05/27/2015 - 06/02/2015.     05/27/2015 the patient initiated treatment with tamoxifen     11/29/2022 screening mammogram results showed no mammographic evidence of malignancy.     09/18/2023 DEXA bone density scan results show osteopenia with a major osteoporotic fracture risk of 8.9% and hip fracture 0.2%.     11/30/2023 screening mammogram results showed no mammographic evidence of malignancy     12/05/2024 screening mammogram results showed no mammographic evidence of malignancy          Interval History 5/5/2025: The patient returns for follow-up on her history of stage I left breast cancer.  She is s/p lumpectomy,

## 2025-05-05 ENCOUNTER — OFFICE VISIT (OUTPATIENT)
Dept: ONCOLOGY | Age: 55
End: 2025-05-05
Payer: COMMERCIAL

## 2025-05-05 ENCOUNTER — HOSPITAL ENCOUNTER (OUTPATIENT)
Dept: INFUSION THERAPY | Age: 55
Discharge: HOME | End: 2025-05-05
Payer: COMMERCIAL

## 2025-05-05 VITALS
OXYGEN SATURATION: 97 % | HEART RATE: 72 BPM | BODY MASS INDEX: 30.83 KG/M2 | WEIGHT: 180.6 LBS | SYSTOLIC BLOOD PRESSURE: 124 MMHG | DIASTOLIC BLOOD PRESSURE: 67 MMHG | HEIGHT: 64 IN | RESPIRATION RATE: 16 BRPM

## 2025-05-05 VITALS
HEART RATE: 72 BPM | HEIGHT: 64 IN | RESPIRATION RATE: 16 BRPM | WEIGHT: 180.6 LBS | OXYGEN SATURATION: 97 % | DIASTOLIC BLOOD PRESSURE: 67 MMHG | BODY MASS INDEX: 30.83 KG/M2 | SYSTOLIC BLOOD PRESSURE: 124 MMHG

## 2025-05-05 DIAGNOSIS — C50.912 MALIGNANT NEOPLASM OF LEFT BREAST IN FEMALE, ESTROGEN RECEPTOR POSITIVE, UNSPECIFIED SITE OF BREAST (HCC): Primary | ICD-10-CM

## 2025-05-05 DIAGNOSIS — Z79.810 PROPHYLACTIC USE OF TAMOXIFEN: ICD-10-CM

## 2025-05-05 DIAGNOSIS — Z78.0 POSTMENOPAUSAL STATUS: ICD-10-CM

## 2025-05-05 DIAGNOSIS — Z17.0 MALIGNANT NEOPLASM OF LEFT BREAST IN FEMALE, ESTROGEN RECEPTOR POSITIVE, UNSPECIFIED SITE OF BREAST (HCC): Primary | ICD-10-CM

## 2025-05-05 PROCEDURE — 99214 OFFICE O/P EST MOD 30 MIN: CPT | Performed by: NURSE PRACTITIONER

## 2025-05-05 PROCEDURE — 99211 OFF/OP EST MAY X REQ PHY/QHP: CPT

## 2025-05-05 NOTE — PATIENT INSTRUCTIONS
DEXA at [place]  Mammogram at [facility]  Return to clinic for follow up one year  Notify the office of any new or worsening symptoms